# Patient Record
Sex: FEMALE | Race: BLACK OR AFRICAN AMERICAN | NOT HISPANIC OR LATINO | Employment: UNEMPLOYED | URBAN - METROPOLITAN AREA
[De-identification: names, ages, dates, MRNs, and addresses within clinical notes are randomized per-mention and may not be internally consistent; named-entity substitution may affect disease eponyms.]

---

## 2021-05-14 ENCOUNTER — SOCIAL WORK (OUTPATIENT)
Dept: ADMINISTRATIVE | Facility: OTHER | Age: 31
End: 2021-05-14

## 2021-06-10 PROBLEM — O99.331 PREGNANCY COMPLICATED BY TOBACCO USE IN FIRST TRIMESTER: Status: ACTIVE | Noted: 2021-06-10

## 2021-06-10 PROBLEM — N63.10 BREAST MASS, RIGHT: Status: ACTIVE | Noted: 2021-06-10

## 2021-06-10 PROBLEM — F32.A DEPRESSION DURING PREGNANCY IN FIRST TRIMESTER: Status: ACTIVE | Noted: 2021-06-10

## 2021-06-10 PROBLEM — O99.341 DEPRESSION DURING PREGNANCY IN FIRST TRIMESTER: Status: ACTIVE | Noted: 2021-06-10

## 2021-06-10 PROBLEM — Z80.3 FAMILY HISTORY OF BREAST CANCER: Status: ACTIVE | Noted: 2021-06-10

## 2021-07-09 PROBLEM — O23.42 URINARY TRACT INFECTION IN MOTHER DURING SECOND TRIMESTER OF PREGNANCY: Status: ACTIVE | Noted: 2021-07-09

## 2021-07-09 PROBLEM — O09.92 SUPERVISION OF HIGH RISK PREGNANCY IN SECOND TRIMESTER: Status: ACTIVE | Noted: 2021-07-09

## 2021-07-09 PROBLEM — Z98.891 PREVIOUS CESAREAN SECTION: Status: ACTIVE | Noted: 2021-07-09

## 2021-08-06 PROBLEM — O99.331 PREGNANCY COMPLICATED BY TOBACCO USE IN FIRST TRIMESTER: Chronic | Status: ACTIVE | Noted: 2021-06-10

## 2021-11-05 PROBLEM — O24.410 DIET CONTROLLED GESTATIONAL DIABETES MELLITUS (GDM) IN THIRD TRIMESTER: Status: ACTIVE | Noted: 2021-11-05

## 2021-11-05 PROBLEM — O99.340 DEPRESSION AFFECTING PREGNANCY: Status: ACTIVE | Noted: 2021-06-10

## 2021-11-16 PROBLEM — O09.93 SUPERVISION OF HIGH RISK PREGNANCY IN THIRD TRIMESTER: Status: ACTIVE | Noted: 2021-07-09

## 2022-01-04 PROBLEM — Z34.90 ENCOUNTER FOR ELECTIVE INDUCTION OF LABOR: Status: ACTIVE | Noted: 2022-01-04

## 2022-01-05 PROBLEM — O41.1230 CHORIOAMNIONITIS IN THIRD TRIMESTER: Status: ACTIVE | Noted: 2022-01-05

## 2023-03-14 ENCOUNTER — SOCIAL WORK (OUTPATIENT)
Dept: ADMINISTRATIVE | Facility: OTHER | Age: 33
End: 2023-03-14

## 2023-03-14 NOTE — PROGRESS NOTES
SW met with pt regarding initial OB assessment. Pt stated this is her 4th pregnancy/1-ectopic. Pt stated lives with her boyfriend/child-1 and able to perform ADL's independently. Pt stated does work. Pt stated support system is her boyfriend/[employer]. Pt stated has medicaid([place]). Pt stated does not have WIC. Pt stated is going to breastfeed. SW provide pt with information on other community resources.SW faxed and scanned pt's notification of pregnancy into epic.  No other needs identified at this time.    Audrey Buitrago MSW  Pager#[phone]

## 2023-04-17 PROBLEM — O26.892 PREGNANCY HEADACHE IN SECOND TRIMESTER: Status: ACTIVE | Noted: 2023-04-17

## 2023-04-17 PROBLEM — R51.9 PREGNANCY HEADACHE IN SECOND TRIMESTER: Status: ACTIVE | Noted: 2023-04-17

## 2023-04-17 PROBLEM — Z71.6 ENCOUNTER FOR SMOKING CESSATION COUNSELING: Status: ACTIVE | Noted: 2023-04-17

## 2023-04-17 PROBLEM — O28.3 ECHOGENIC INTRACARDIAC FOCUS OF FETUS ON PRENATAL ULTRASOUND: Status: ACTIVE | Noted: 2021-08-16

## 2023-05-03 PROBLEM — O35.BXX0 ECHOGENIC FOCUS OF HEART OF FETUS AFFECTING ANTEPARTUM CARE OF MOTHER: Status: ACTIVE | Noted: 2021-08-16

## 2023-06-05 PROBLEM — Z34.90 ENCOUNTER FOR ELECTIVE INDUCTION OF LABOR: Status: RESOLVED | Noted: 2022-01-04 | Resolved: 2023-06-05

## 2023-06-05 PROBLEM — O41.1230 CHORIOAMNIONITIS IN THIRD TRIMESTER: Status: RESOLVED | Noted: 2022-01-05 | Resolved: 2023-06-05

## 2023-06-07 PROBLEM — R10.9 ABDOMINAL PAIN COMPLICATING PREGNANCY: Status: ACTIVE | Noted: 2023-06-07

## 2023-06-07 PROBLEM — O09.93 SUPERVISION OF HIGH-RISK PREGNANCY, THIRD TRIMESTER: Status: ACTIVE | Noted: 2023-06-07

## 2023-06-07 PROBLEM — O26.899 ABDOMINAL PAIN COMPLICATING PREGNANCY: Status: ACTIVE | Noted: 2023-06-07

## 2023-07-19 ENCOUNTER — PATIENT MESSAGE (OUTPATIENT)
Dept: RESEARCH | Facility: HOSPITAL | Age: 33
End: 2023-07-19

## 2023-07-20 ENCOUNTER — SOCIAL WORK (OUTPATIENT)
Dept: ADMINISTRATIVE | Facility: OTHER | Age: 33
End: 2023-07-20

## 2023-07-20 NOTE — PROGRESS NOTES
SW received paperwork from [facility] regarding MD signature for pt breast pump. SW provide MD the paperwork for his signature. SW later received the completed paperwork and fax to([phone]). SW scanned paperwork into epic. No other needs identified at this time.     Audrey Buitrago,MSW   Pager#[phone]

## 2023-07-24 ENCOUNTER — PATIENT MESSAGE (OUTPATIENT)
Dept: RESEARCH | Facility: HOSPITAL | Age: 33
End: 2023-07-24

## 2023-08-08 PROBLEM — O26.899 ABDOMINAL PAIN COMPLICATING PREGNANCY: Status: RESOLVED | Noted: 2023-06-07 | Resolved: 2023-08-08

## 2023-08-08 PROBLEM — O24.410 DIET CONTROLLED GESTATIONAL DIABETES MELLITUS (GDM) IN THIRD TRIMESTER: Status: RESOLVED | Noted: 2021-11-05 | Resolved: 2023-08-08

## 2023-08-08 PROBLEM — O23.42 URINARY TRACT INFECTION IN MOTHER DURING SECOND TRIMESTER OF PREGNANCY: Status: RESOLVED | Noted: 2021-07-09 | Resolved: 2023-08-08

## 2023-08-08 PROBLEM — O09.92 SUPERVISION OF HIGH RISK PREGNANCY IN SECOND TRIMESTER: Status: RESOLVED | Noted: 2021-07-09 | Resolved: 2023-08-08

## 2023-08-08 PROBLEM — O36.5990 FETAL GROWTH RESTRICTION ANTEPARTUM: Status: ACTIVE | Noted: 2023-08-08

## 2023-08-08 PROBLEM — R10.9 ABDOMINAL PAIN COMPLICATING PREGNANCY: Status: RESOLVED | Noted: 2023-06-07 | Resolved: 2023-08-08

## 2023-08-18 PROBLEM — O99.210 OBESITY IN PREGNANCY: Status: ACTIVE | Noted: 2023-08-18
